# Patient Record
Sex: FEMALE | Race: OTHER | NOT HISPANIC OR LATINO
[De-identification: names, ages, dates, MRNs, and addresses within clinical notes are randomized per-mention and may not be internally consistent; named-entity substitution may affect disease eponyms.]

---

## 2019-11-11 ENCOUNTER — APPOINTMENT (OUTPATIENT)
Dept: SPINE | Facility: CLINIC | Age: 74
End: 2019-11-11
Payer: MEDICARE

## 2019-11-11 VITALS
DIASTOLIC BLOOD PRESSURE: 108 MMHG | BODY MASS INDEX: 35.82 KG/M2 | HEIGHT: 65 IN | HEART RATE: 105 BPM | WEIGHT: 215 LBS | RESPIRATION RATE: 18 BRPM | SYSTOLIC BLOOD PRESSURE: 195 MMHG | OXYGEN SATURATION: 99 %

## 2019-11-11 DIAGNOSIS — F41.9 ANXIETY DISORDER, UNSPECIFIED: ICD-10-CM

## 2019-11-11 DIAGNOSIS — Z80.9 FAMILY HISTORY OF MALIGNANT NEOPLASM, UNSPECIFIED: ICD-10-CM

## 2019-11-11 DIAGNOSIS — M54.2 CERVICALGIA: ICD-10-CM

## 2019-11-11 DIAGNOSIS — I10 ESSENTIAL (PRIMARY) HYPERTENSION: ICD-10-CM

## 2019-11-11 DIAGNOSIS — Z87.39 PERSONAL HISTORY OF OTHER DISEASES OF THE MUSCULOSKELETAL SYSTEM AND CONNECTIVE TISSUE: ICD-10-CM

## 2019-11-11 DIAGNOSIS — M19.90 UNSPECIFIED OSTEOARTHRITIS, UNSPECIFIED SITE: ICD-10-CM

## 2019-11-11 PROBLEM — Z00.00 ENCOUNTER FOR PREVENTIVE HEALTH EXAMINATION: Status: ACTIVE | Noted: 2019-11-11

## 2019-11-11 PROCEDURE — 99205 OFFICE O/P NEW HI 60 MIN: CPT

## 2019-11-11 RX ORDER — AMLODIPINE BESYLATE 5 MG/1
TABLET ORAL
Refills: 0 | Status: ACTIVE | COMMUNITY

## 2019-11-11 RX ORDER — PREDNISONE 5 MG/1
5 TABLET ORAL
Refills: 0 | Status: ACTIVE | COMMUNITY

## 2019-11-11 RX ORDER — IBUPROFEN 800 MG/1
TABLET, FILM COATED ORAL
Refills: 0 | Status: ACTIVE | COMMUNITY

## 2019-11-11 NOTE — HISTORY OF PRESENT ILLNESS
[de-identified] : Patient is 74yr old female with Phx of RA, Anxiety and HTN presents today with c/o neck pain since September 2019. Per patient, she was on vacation in September wearing a bathing suit that ties behind her neck. She states that when she woke up the next morning she had the same sensation like she was still wearing her bathing suit even though it was not on. She also states that at the same time, she had numbness/tingling to her right foot. Both of these symptoms, she states, lessened in intensity and was not interfering in her daily living, but now this sensation has re-occured. She now has c/o posterior/right lateral neck "pulling" that extends to her right FA. She denies balance issues, difficulty with gripping objects and numbness/tingling in her hands. \par \par She presents today with a C-Spine X-ray to review and discuss treatment options. Her blood pressure was elevated on today's visit, patient states she has not eaten or taken her medication today. Snacks provided today and patient took BP medication, she denies h/a, blurry vision or dizziness.

## 2019-11-11 NOTE — ASSESSMENT
[FreeTextEntry1] : The patient is a really pleasant 74-year-old woman with a past medical history of rheumatoid arthritis referred by 1 of my colleagues Dr. Leventhal.  She presents with a history of neck strain as well as right hemibody tightness that she is experienced since September.  She said no limitation in her range of motion in her neck and she has had no balance issues no clumsiness no tripping no problem with her hands and no radicular pain.  She was being worked up and was sent for an MRI however the insurance company denied it and so she presents here for a full neurologic examination work-up as well as determining the need for further imaging.  On physical examination she has some restriction in her cervical range of motion specifically with extension and rotation and she does have a positive Spurling's looking up to the left.  She is 5 out of 5 strength in all other muscle groups tested she has slightly hyperreflexic biceps reflexes but otherwise is hyporeflexic at 1+.  She does have a positive Chambers's on the left she has a mildly positive Romberg but has significant difficulty with tandem gait.  She had AP and lateral cervical x-rays from [facility] radiology which I reviewed which demonstrated some questionable subluxation of the C1 ring anteriorly and significant multilevel spondylosis with uncinate hypertrophy and facet arthropathy.  Given her history her symptoms and the time course the differential is broad however includes C1 to arthritis cervical spondylosis with herniated disc and acute radiculopathy cervical strain as well as axial neck pain.  Given the anterior subluxation of the C1 arch and her signs of myelopathy I think that advanced imaging is necessary.  I recommend getting a CT scan of the cervical spine to evaluate the C1-2 joint as well as for any bony arthritis as well as an MRI of the cervical spine without contrast to evaluate for any spinal cord compression herniated disks or foraminal stenosis.  I do long discussion with her regarding the natural history of rheumatoid arthritis and cervical spine issues as well as the vast differential diagnosis that we are faced with.  She is claustrophobic and so we will provide her with a prescription for Valium to take approximately 30 to 60 minutes prior to her imaging studies.  Additionally like to see her back in the office once these are completed as then we can have a more informed discussion about risk benefits alternatives to therapy including conservative management versus surgery.  She had several excellent questions of which I answered the best of my ability.  I also provided with my contact information including my cell phone number and my email.  I will communicate with Dr. Leventhal's office tomorrow regarding my findings.  Also she had elevated blood pressure on the visit today which she states is not abnormal especially given her history of anxiety.  She did take her blood pressure medication her blood pressure was coming down at the end of our visit.  I recommended strongly that she follow-up with Dr. Leventhal as to better control this as soon as possible.\par \par Jean Marie Reveles M.D., M.Sc.\par \[facility] Department of Neurosurgery\par [facility] [facility] [employer] [facility]\par [facility]\par [facility]\[place], [place]\par [email or web address]\par [phone]

## 2019-11-11 NOTE — PLAN
[FreeTextEntry1] : Plan:\par - C-Spine imaging reviewed by Dr. Reveles and with patient, all questions answered.\par - We recommend additional imaging before a final plan is made, we will order Cervical Spine CT and MRI w/o contrast. \par - Blood pressure taken at end of visit, 178/99.\par - Patient will return to see us after imaging is completed.

## 2019-11-11 NOTE — REASON FOR VISIT
[Referred By: _________] : Patient was referred by [facility] [New Patient Visit] : a new patient visit [FreeTextEntry1] : Neck Pain

## 2019-11-11 NOTE — PHYSICAL EXAM
[General Appearance - Alert] : alert [General Appearance - In No Acute Distress] : in no acute distress [General Appearance - Well Nourished] : well nourished [Oriented To Time, Place, And Person] : oriented to person, place, and time [Sensation Pain / Temperature Decrease] : pain and temperature was intact [Sensation Tactile Decrease] : light touch was intact [Motor Handedness Right-Handed] : the patient is right hand dominant [Limited Balance] : the patient's balance was impaired [Romberg's Sign] : a positive Romberg's sign was present [2+] : Brachioradialis left 2+ [1+] : Patella right 1+ [Chambers] : Chambers's sign was demonstrated [No Visual Abnormalities] : no visible abnormailities [Neck Appearance] : the appearance of the neck was normal [Sclera] : the sclera and conjunctiva were normal [Outer Ear] : the ears and nose were normal in appearance [Skin Color & Pigmentation] : normal skin color and pigmentation [] : no respiratory distress [de-identified] : + Chambers's LEFT\par - difficulty with tandem gait\par -Neck Extension: 45 degrees (up) 30-35 degrees (down)\par - ROM Neck: 30 degrees (right/left)\par  [Able to toe walk] : the patient was not able to toe walk [Able to heel walk] : the patient was not able to heel walk

## 2019-11-15 ENCOUNTER — OTHER (OUTPATIENT)
Age: 74
End: 2019-11-15

## 2019-11-15 RX ORDER — ALPRAZOLAM 0.25 MG/1
0.25 TABLET ORAL
Qty: 2 | Refills: 0 | Status: ACTIVE | COMMUNITY
Start: 2019-11-15 | End: 1900-01-01

## 2023-05-15 ENCOUNTER — OUTPATIENT (OUTPATIENT)
Dept: OUTPATIENT SERVICES | Facility: HOSPITAL | Age: 78
LOS: 1 days | End: 2023-05-15
Payer: MEDICARE

## 2023-05-15 ENCOUNTER — APPOINTMENT (OUTPATIENT)
Dept: INFUSION THERAPY | Facility: CLINIC | Age: 78
End: 2023-05-15

## 2023-05-15 VITALS
TEMPERATURE: 98 F | DIASTOLIC BLOOD PRESSURE: 70 MMHG | SYSTOLIC BLOOD PRESSURE: 112 MMHG | OXYGEN SATURATION: 99 % | RESPIRATION RATE: 18 BRPM | HEART RATE: 88 BPM

## 2023-05-15 VITALS
HEART RATE: 90 BPM | DIASTOLIC BLOOD PRESSURE: 68 MMHG | WEIGHT: 199.08 LBS | RESPIRATION RATE: 16 BRPM | TEMPERATURE: 98 F | OXYGEN SATURATION: 99 % | SYSTOLIC BLOOD PRESSURE: 110 MMHG | HEIGHT: 65 IN

## 2023-05-15 DIAGNOSIS — D50.9 IRON DEFICIENCY ANEMIA, UNSPECIFIED: ICD-10-CM

## 2023-05-15 PROCEDURE — 96365 THER/PROPH/DIAG IV INF INIT: CPT

## 2023-05-15 RX ORDER — SODIUM FERRIC GLUCONAT/SUCROSE 62.5MG/5ML
125 AMPUL (ML) INTRAVENOUS ONCE
Refills: 0 | Status: COMPLETED | OUTPATIENT
Start: 2023-05-15 | End: 2023-05-15

## 2023-05-15 RX ORDER — ACETAMINOPHEN 500 MG
650 TABLET ORAL ONCE
Refills: 0 | Status: COMPLETED | OUTPATIENT
Start: 2023-05-15 | End: 2023-05-15

## 2023-05-15 RX ORDER — DIPHENHYDRAMINE HCL 50 MG
50 CAPSULE ORAL ONCE
Refills: 0 | Status: COMPLETED | OUTPATIENT
Start: 2023-05-15 | End: 2023-05-15

## 2023-05-15 RX ADMIN — Medication 125 MILLIGRAM(S): at 11:30

## 2023-05-15 RX ADMIN — Medication 650 MILLIGRAM(S): at 10:25

## 2023-05-15 RX ADMIN — Medication 110 MILLIGRAM(S): at 10:22

## 2023-05-15 RX ADMIN — Medication 650 MILLIGRAM(S): at 10:21

## 2023-05-15 RX ADMIN — Medication 50 MILLIGRAM(S): at 10:21

## 2023-05-22 ENCOUNTER — APPOINTMENT (OUTPATIENT)
Dept: INFUSION THERAPY | Facility: CLINIC | Age: 78
End: 2023-05-22

## 2023-05-22 ENCOUNTER — OUTPATIENT (OUTPATIENT)
Dept: OUTPATIENT SERVICES | Facility: HOSPITAL | Age: 78
LOS: 1 days | End: 2023-05-22
Payer: MEDICARE

## 2023-05-22 VITALS
RESPIRATION RATE: 18 BRPM | HEART RATE: 72 BPM | TEMPERATURE: 98 F | OXYGEN SATURATION: 96 % | DIASTOLIC BLOOD PRESSURE: 75 MMHG | SYSTOLIC BLOOD PRESSURE: 130 MMHG

## 2023-05-22 VITALS
SYSTOLIC BLOOD PRESSURE: 128 MMHG | RESPIRATION RATE: 18 BRPM | OXYGEN SATURATION: 98 % | TEMPERATURE: 99 F | HEART RATE: 84 BPM | DIASTOLIC BLOOD PRESSURE: 77 MMHG

## 2023-05-22 DIAGNOSIS — D50.9 IRON DEFICIENCY ANEMIA, UNSPECIFIED: ICD-10-CM

## 2023-05-22 PROCEDURE — 96365 THER/PROPH/DIAG IV INF INIT: CPT

## 2023-05-22 RX ORDER — SODIUM FERRIC GLUCONAT/SUCROSE 62.5MG/5ML
125 AMPUL (ML) INTRAVENOUS ONCE
Refills: 0 | Status: COMPLETED | OUTPATIENT
Start: 2023-05-22 | End: 2023-05-22

## 2023-05-22 RX ORDER — ACETAMINOPHEN 500 MG
650 TABLET ORAL ONCE
Refills: 0 | Status: COMPLETED | OUTPATIENT
Start: 2023-05-22 | End: 2023-05-22

## 2023-05-22 RX ORDER — DIPHENHYDRAMINE HCL 50 MG
50 CAPSULE ORAL ONCE
Refills: 0 | Status: COMPLETED | OUTPATIENT
Start: 2023-05-22 | End: 2023-05-22

## 2023-05-22 RX ADMIN — Medication 650 MILLIGRAM(S): at 11:30

## 2023-05-22 RX ADMIN — Medication 50 MILLIGRAM(S): at 10:45

## 2023-05-22 RX ADMIN — Medication 650 MILLIGRAM(S): at 10:45

## 2023-05-22 RX ADMIN — Medication 125 MILLIGRAM(S): at 11:55

## 2023-05-22 RX ADMIN — Medication 110 MILLIGRAM(S): at 10:49

## 2023-06-01 ENCOUNTER — OUTPATIENT (OUTPATIENT)
Dept: OUTPATIENT SERVICES | Facility: HOSPITAL | Age: 78
LOS: 1 days | End: 2023-06-01
Payer: MEDICARE

## 2023-06-01 ENCOUNTER — APPOINTMENT (OUTPATIENT)
Dept: INFUSION THERAPY | Facility: CLINIC | Age: 78
End: 2023-06-01

## 2023-06-01 VITALS
TEMPERATURE: 98 F | DIASTOLIC BLOOD PRESSURE: 74 MMHG | WEIGHT: 199.08 LBS | SYSTOLIC BLOOD PRESSURE: 112 MMHG | OXYGEN SATURATION: 99 % | HEART RATE: 78 BPM | HEIGHT: 65 IN | RESPIRATION RATE: 18 BRPM

## 2023-06-01 VITALS
HEART RATE: 78 BPM | DIASTOLIC BLOOD PRESSURE: 74 MMHG | SYSTOLIC BLOOD PRESSURE: 117 MMHG | OXYGEN SATURATION: 99 % | RESPIRATION RATE: 18 BRPM | TEMPERATURE: 98 F

## 2023-06-01 DIAGNOSIS — D50.9 IRON DEFICIENCY ANEMIA, UNSPECIFIED: ICD-10-CM

## 2023-06-01 PROCEDURE — 96365 THER/PROPH/DIAG IV INF INIT: CPT

## 2023-06-01 RX ORDER — SODIUM FERRIC GLUCONAT/SUCROSE 62.5MG/5ML
125 AMPUL (ML) INTRAVENOUS ONCE
Refills: 0 | Status: COMPLETED | OUTPATIENT
Start: 2023-06-01 | End: 2023-06-01

## 2023-06-01 RX ORDER — ACETAMINOPHEN 500 MG
650 TABLET ORAL ONCE
Refills: 0 | Status: COMPLETED | OUTPATIENT
Start: 2023-06-01 | End: 2023-06-01

## 2023-06-01 RX ORDER — DIPHENHYDRAMINE HCL 50 MG
50 CAPSULE ORAL ONCE
Refills: 0 | Status: COMPLETED | OUTPATIENT
Start: 2023-06-01 | End: 2023-06-01

## 2023-06-01 RX ADMIN — Medication 110 MILLIGRAM(S): at 11:25

## 2023-06-01 RX ADMIN — Medication 650 MILLIGRAM(S): at 11:10

## 2023-06-01 RX ADMIN — Medication 650 MILLIGRAM(S): at 11:25

## 2023-06-01 RX ADMIN — Medication 125 MILLIGRAM(S): at 12:25

## 2023-06-01 RX ADMIN — Medication 50 MILLIGRAM(S): at 11:20

## 2023-06-09 RX ORDER — ACETAMINOPHEN 500 MG
650 TABLET ORAL ONCE
Refills: 0 | Status: COMPLETED | OUTPATIENT
Start: 2023-06-19 | End: 2023-06-19

## 2023-06-09 RX ORDER — SODIUM FERRIC GLUCONAT/SUCROSE 62.5MG/5ML
125 AMPUL (ML) INTRAVENOUS ONCE
Refills: 0 | Status: COMPLETED | OUTPATIENT
Start: 2023-06-19 | End: 2023-06-19

## 2023-06-09 RX ORDER — DIPHENHYDRAMINE HCL 50 MG
50 CAPSULE ORAL ONCE
Refills: 0 | Status: COMPLETED | OUTPATIENT
Start: 2023-06-19 | End: 2023-06-19

## 2023-06-12 ENCOUNTER — APPOINTMENT (OUTPATIENT)
Dept: INFUSION THERAPY | Facility: CLINIC | Age: 78
End: 2023-06-12

## 2023-06-12 ENCOUNTER — OUTPATIENT (OUTPATIENT)
Dept: OUTPATIENT SERVICES | Facility: HOSPITAL | Age: 78
LOS: 1 days | End: 2023-06-12
Payer: MEDICARE

## 2023-06-12 VITALS
HEART RATE: 85 BPM | DIASTOLIC BLOOD PRESSURE: 75 MMHG | TEMPERATURE: 98 F | RESPIRATION RATE: 18 BRPM | HEIGHT: 65 IN | OXYGEN SATURATION: 97 % | WEIGHT: 199.08 LBS | SYSTOLIC BLOOD PRESSURE: 137 MMHG

## 2023-06-12 VITALS
DIASTOLIC BLOOD PRESSURE: 74 MMHG | OXYGEN SATURATION: 99 % | SYSTOLIC BLOOD PRESSURE: 128 MMHG | RESPIRATION RATE: 18 BRPM | TEMPERATURE: 98 F | HEART RATE: 74 BPM

## 2023-06-12 DIAGNOSIS — D50.9 IRON DEFICIENCY ANEMIA, UNSPECIFIED: ICD-10-CM

## 2023-06-12 LAB
FERRITIN SERPL-MCNC: 90 NG/ML — SIGNIFICANT CHANGE UP (ref 15–150)
HCT VFR BLD CALC: 26.4 % — LOW (ref 34.5–45)
HGB BLD-MCNC: 7.7 G/DL — LOW (ref 11.5–15.5)
IRON SATN MFR SERPL: 120 UG/DL — SIGNIFICANT CHANGE UP (ref 30–160)
IRON SATN MFR SERPL: 43 % — SIGNIFICANT CHANGE UP (ref 14–50)
MCHC RBC-ENTMCNC: 24 PG — LOW (ref 27–34)
MCHC RBC-ENTMCNC: 29.2 GM/DL — LOW (ref 32–36)
MCV RBC AUTO: 82.2 FL — SIGNIFICANT CHANGE UP (ref 80–100)
NRBC # BLD: 0 /100 WBCS — SIGNIFICANT CHANGE UP (ref 0–0)
PLATELET # BLD AUTO: 315 K/UL — SIGNIFICANT CHANGE UP (ref 150–400)
RBC # BLD: 3.21 M/UL — LOW (ref 3.8–5.2)
RBC # FLD: 22 % — HIGH (ref 10.3–14.5)
TIBC SERPL-MCNC: 281 UG/DL — SIGNIFICANT CHANGE UP (ref 220–430)
UIBC SERPL-MCNC: 161 UG/DL — SIGNIFICANT CHANGE UP (ref 110–370)
WBC # BLD: 7.9 K/UL — SIGNIFICANT CHANGE UP (ref 3.8–10.5)
WBC # FLD AUTO: 7.9 K/UL — SIGNIFICANT CHANGE UP (ref 3.8–10.5)

## 2023-06-12 PROCEDURE — 85027 COMPLETE CBC AUTOMATED: CPT

## 2023-06-12 PROCEDURE — 83550 IRON BINDING TEST: CPT

## 2023-06-12 PROCEDURE — 83540 ASSAY OF IRON: CPT

## 2023-06-12 PROCEDURE — 36415 COLL VENOUS BLD VENIPUNCTURE: CPT

## 2023-06-12 PROCEDURE — 96365 THER/PROPH/DIAG IV INF INIT: CPT

## 2023-06-12 PROCEDURE — 82728 ASSAY OF FERRITIN: CPT

## 2023-06-12 RX ORDER — DIPHENHYDRAMINE HCL 50 MG
50 CAPSULE ORAL ONCE
Refills: 0 | Status: COMPLETED | OUTPATIENT
Start: 2023-06-12 | End: 2023-06-12

## 2023-06-12 RX ORDER — SODIUM FERRIC GLUCONAT/SUCROSE 62.5MG/5ML
125 AMPUL (ML) INTRAVENOUS ONCE
Refills: 0 | Status: COMPLETED | OUTPATIENT
Start: 2023-06-12 | End: 2023-06-12

## 2023-06-12 RX ORDER — ACETAMINOPHEN 500 MG
650 TABLET ORAL ONCE
Refills: 0 | Status: COMPLETED | OUTPATIENT
Start: 2023-06-12 | End: 2023-06-12

## 2023-06-12 RX ADMIN — Medication 650 MILLIGRAM(S): at 17:10

## 2023-06-12 RX ADMIN — Medication 125 MILLIGRAM(S): at 17:45

## 2023-06-12 RX ADMIN — Medication 50 MILLIGRAM(S): at 16:40

## 2023-06-12 RX ADMIN — Medication 650 MILLIGRAM(S): at 16:40

## 2023-06-12 RX ADMIN — Medication 110 MILLIGRAM(S): at 16:42

## 2023-06-19 ENCOUNTER — APPOINTMENT (OUTPATIENT)
Dept: INFUSION THERAPY | Facility: CLINIC | Age: 78
End: 2023-06-19

## 2023-06-19 ENCOUNTER — OUTPATIENT (OUTPATIENT)
Dept: OUTPATIENT SERVICES | Facility: HOSPITAL | Age: 78
LOS: 1 days | End: 2023-06-19
Payer: MEDICARE

## 2023-06-19 VITALS
TEMPERATURE: 97 F | RESPIRATION RATE: 18 BRPM | WEIGHT: 199.08 LBS | SYSTOLIC BLOOD PRESSURE: 112 MMHG | OXYGEN SATURATION: 99 % | DIASTOLIC BLOOD PRESSURE: 73 MMHG | HEART RATE: 60 BPM | HEIGHT: 65 IN

## 2023-06-19 DIAGNOSIS — D50.9 IRON DEFICIENCY ANEMIA, UNSPECIFIED: ICD-10-CM

## 2023-06-19 PROCEDURE — 96365 THER/PROPH/DIAG IV INF INIT: CPT

## 2023-06-19 RX ADMIN — Medication 50 MILLIGRAM(S): at 14:06

## 2023-06-19 RX ADMIN — Medication 650 MILLIGRAM(S): at 14:32

## 2023-06-19 RX ADMIN — Medication 650 MILLIGRAM(S): at 14:06

## 2023-06-19 RX ADMIN — Medication 125 MILLIGRAM(S): at 15:07

## 2023-06-19 RX ADMIN — Medication 110 MILLIGRAM(S): at 14:07
